# Patient Record
(demographics unavailable — no encounter records)

---
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
## 2024-10-07 NOTE — PHYSICAL EXAM
[FreeTextEntry1] : Exam limited via telehealth:\par  MS:  awake, alert, coherent, fluent, comprehension intact, affect stable.  oriented\par  Cardiac/pulmonary/extremity exam deferred via telehealth.\par   [General Appearance - Alert] : alert [General Appearance - In No Acute Distress] : in no acute distress [General Appearance - Well Nourished] : well nourished [Oriented To Time, Place, And Person] : oriented to person, place, and time [Affect] : the affect was normal [Person] : oriented to person [Place] : oriented to place [Time] : oriented to time [Cranial Nerves Optic (II)] : visual acuity intact bilaterally,  visual fields full to confrontation, pupils equal round and reactive to light [Cranial Nerves Oculomotor (III)] : extraocular motion intact [Cranial Nerves Trigeminal (V)] : facial sensation intact symmetrically [Cranial Nerves Facial (VII)] : face symmetrical [Cranial Nerves Vestibulocochlear (VIII)] : hearing was intact bilaterally [Cranial Nerves Glossopharyngeal (IX)] : tongue and palate midline [Cranial Nerves Accessory (XI - Cranial And Spinal)] : head turning and shoulder shrug symmetric [Cranial Nerves Hypoglossal (XII)] : there was no tongue deviation with protrusion [Motor Tone] : muscle tone was normal in all four extremities [Motor Strength] : muscle strength was normal in all four extremities [Involuntary Movements] : no involuntary movements were seen [Sensation Tactile Decrease] : light touch was intact [Balance] : balance was intact [2+] : Patella left 2+ [Sclera] : the sclera and conjunctiva were normal [PERRL With Normal Accommodation] : pupils were equal in size, round, reactive to light, with normal accommodation [Outer Ear] : the ears and nose were normal in appearance [Neck Appearance] : the appearance of the neck was normal [Abnormal Walk] : normal gait [Musculoskeletal - Swelling] : no joint swelling seen [Skin Color & Pigmentation] : normal skin color and pigmentation [] : no rash [Paresis Pronator Drift Right-Sided] : no pronator drift on the right [Paresis Pronator Drift Left-Sided] : no pronator drift on the left [Motor Strength Upper Extremities Bilaterally] : strength was normal in both upper extremities [Motor Strength Lower Extremities Bilaterally] : strength was normal in both lower extremities

## 2024-10-07 NOTE — DISCUSSION/SUMMARY
[Medically Refractory (seizure within the last year)] : Medically Refractory (seizure within the last year) [Complex Partial] : complex partial [Secondary Generalization] : secondary generalization [Idiopathic] : idiopathic  [Focal] : focal [Risks Associated with Driving/[place] Law] : As per my usual protocol, the patient was advised in regards to risks and driving privileges associated with the [place] Guidelines.  [Safety Recommendations] : The patient was advised in regards to the risk of seizures and general seizure safety recommendations including not to be bathing alone, climbing to high places and operating heavy machinery. [Compliance with Medications] : The importance of compliance with medications was reinforced. [Medication Side Effects] : High frequency and serious potential medication adverse effects were reviewed with the patient, including but not exclusive to psychiatric effects.  Information sheets on medication side effects were made available to the patient in our clinic.  The patient or advocate agrees to notify us for any concerns. [Surgical Options/Risks/Benefits] : Surgical options/risks/benefits for intractable epilepsy were discussed. [Risk of Death] : Risk of death associated with seizures / SUDEP was discussed. [EMU Consent:] : As per our usual protocol, staff discussed video EEG monitoring for the purpose of diagnosis, treatment decisions, teaching, research or publication (if de-identified).  Patient aware recording is continuous (24hrs/day), that they may be under constant observation (with exceptions), and that portions of the video EEG will be stored digitally.  In some cases AEDs may be adjusted to allow for observable seizure activity. The anticipated benefits of the study, the foreseeable risks associated with the procedure including experiencing seizures, which could be more severe than usual. The risk from seizures includes falls, fractures, muscle injury, dental injury, postictal psychiatric symptoms, and heart rate or breathing abnormalities. There will be a risk of experiencing skin irritation or breakdown from the electrodes being placed on the skin.  [Mental Health Evaluation] : Mental health evaluation" was recommended with either our  and psychologist, at [facility] ([facility] ): [phone], or [facility] Intake: [phone] [Inpatient video EEG study ordered with length of stay anticipated in days: _____] : Inpatient video EEG study ordered with length of stay anticipated in days: [unfilled] [Event capture (for localization, differential diagnosis) (typically 3-5 days)] : Event capture (for localization, differential diagnosis) (typically 3-5 days)  [FreeTextEntry1] : 49year-old RH male with a history of depression, paroxysmal Afib, R DVT (2017, 2/2019) and h/o DVT, B/L PE (2/2019 and 2024) on Xarelto, L hydronephrosis and ureteral stone  -Intractable focal epilepsy characterized by focal aware, focal impaired and focal to BL tonic clonic sz.  MRI brain unremarkable. EEG with RT SW.  Szs poorly localized/lateralized. no interval sz on Xcopri 400 mg and  mg bid Had recent PE was put back on Xarelto   Plan: - Plan of care was discussed with Dr Mejía attending. - Continue on  mg bid well tolerated  - Continue Xcopri 400 mg qhs well tolerated - continue CZP ODT 1mg prn sz aura - no driving allowed /restricted, safety concerns - Follow up with PMD, needs to establish care with cardiologist, pulmonologist, hematologist.  was recently restarted on Xarelto.  - Discussed [facility] for online therapy.  requested FRANCIA LEDESMA to reach out to pt.  - npsych referral.  d/w pt re surgical candidacy with R temporal IEDs, with prior neg MRI would need PET, SEEG.  need noninvasive szs captured, rec EMU.  Pt reluctant to proceed will revisit next office visit.  - Provided letter with diagnosis (was fired from a prev job for urinating in public during the episode) - all questions answered - RTC in 3 - 4monnth with dr Sofie iverson to call sooner prn   x 35min    [Simple Partial] : simple partial Ambulatory

## 2024-10-07 NOTE — PHYSICAL EXAM
[FreeTextEntry1] : Exam limited via telehealth:\par  MS:  awake, alert, coherent, fluent, comprehension intact, affect stable.  oriented\par  Cardiac/pulmonary/extremity exam deferred via telehealth.\par   [General Appearance - Alert] : alert [General Appearance - In No Acute Distress] : in no acute distress [General Appearance - Well Nourished] : well nourished [Oriented To Time, Place, And Person] : oriented to person, place, and time [Affect] : the affect was normal [Person] : oriented to person [Place] : oriented to place [Time] : oriented to time [Cranial Nerves Optic (II)] : visual acuity intact bilaterally,  visual fields full to confrontation, pupils equal round and reactive to light [Cranial Nerves Oculomotor (III)] : extraocular motion intact [Cranial Nerves Trigeminal (V)] : facial sensation intact symmetrically [Cranial Nerves Facial (VII)] : face symmetrical [Cranial Nerves Vestibulocochlear (VIII)] : hearing was intact bilaterally [Cranial Nerves Glossopharyngeal (IX)] : tongue and palate midline [Cranial Nerves Accessory (XI - Cranial And Spinal)] : head turning and shoulder shrug symmetric [Cranial Nerves Hypoglossal (XII)] : there was no tongue deviation with protrusion [Motor Tone] : muscle tone was normal in all four extremities [Motor Strength] : muscle strength was normal in all four extremities [Involuntary Movements] : no involuntary movements were seen [Sensation Tactile Decrease] : light touch was intact [Balance] : balance was intact [2+] : Patella left 2+ [Sclera] : the sclera and conjunctiva were normal [PERRL With Normal Accommodation] : pupils were equal in size, round, reactive to light, with normal accommodation [Outer Ear] : the ears and nose were normal in appearance [Neck Appearance] : the appearance of the neck was normal [Abnormal Walk] : normal gait [Musculoskeletal - Swelling] : no joint swelling seen [Skin Color & Pigmentation] : normal skin color and pigmentation [] : no rash [Paresis Pronator Drift Left-Sided] : no pronator drift on the left [Paresis Pronator Drift Right-Sided] : no pronator drift on the right [Motor Strength Upper Extremities Bilaterally] : strength was normal in both upper extremities [Motor Strength Lower Extremities Bilaterally] : strength was normal in both lower extremities
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
## 2024-10-07 NOTE — DISCUSSION/SUMMARY
[Medically Refractory (seizure within the last year)] : Medically Refractory (seizure within the last year) [Complex Partial] : complex partial [Secondary Generalization] : secondary generalization [Idiopathic] : idiopathic  [Focal] : focal [Risks Associated with Driving/[place] Law] : As per my usual protocol, the patient was advised in regards to risks and driving privileges associated with the [place] Guidelines.  [Safety Recommendations] : The patient was advised in regards to the risk of seizures and general seizure safety recommendations including not to be bathing alone, climbing to high places and operating heavy machinery. [Compliance with Medications] : The importance of compliance with medications was reinforced. [Medication Side Effects] : High frequency and serious potential medication adverse effects were reviewed with the patient, including but not exclusive to psychiatric effects.  Information sheets on medication side effects were made available to the patient in our clinic.  The patient or advocate agrees to notify us for any concerns. [Surgical Options/Risks/Benefits] : Surgical options/risks/benefits for intractable epilepsy were discussed. [Risk of Death] : Risk of death associated with seizures / SUDEP was discussed. [EMU Consent:] : As per our usual protocol, staff discussed video EEG monitoring for the purpose of diagnosis, treatment decisions, teaching, research or publication (if de-identified).  Patient aware recording is continuous (24hrs/day), that they may be under constant observation (with exceptions), and that portions of the video EEG will be stored digitally.  In some cases AEDs may be adjusted to allow for observable seizure activity. The anticipated benefits of the study, the foreseeable risks associated with the procedure including experiencing seizures, which could be more severe than usual. The risk from seizures includes falls, fractures, muscle injury, dental injury, postictal psychiatric symptoms, and heart rate or breathing abnormalities. There will be a risk of experiencing skin irritation or breakdown from the electrodes being placed on the skin.  [Mental Health Evaluation] : Mental health evaluation" was recommended with either our  and psychologist, at [facility] ([facility] ): [phone], or [facility] Intake: [phone] [Inpatient video EEG study ordered with length of stay anticipated in days: _____] : Inpatient video EEG study ordered with length of stay anticipated in days: [unfilled] [Event capture (for localization, differential diagnosis) (typically 3-5 days)] : Event capture (for localization, differential diagnosis) (typically 3-5 days)  [FreeTextEntry1] : 49year-old RH male with a history of depression, paroxysmal Afib, R DVT (2017, 2/2019) and h/o DVT, B/L PE (2/2019 and 2024) on Xarelto, L hydronephrosis and ureteral stone  -Intractable focal epilepsy characterized by focal aware, focal impaired and focal to BL tonic clonic sz.  MRI brain unremarkable. EEG with RT SW.  Szs poorly localized/lateralized. no interval sz on Xcopri 400 mg and  mg bid Had recent PE was put back on Xarelto   Plan: - Plan of care was discussed with Dr Mejía attending. - Continue on  mg bid well tolerated  - Continue Xcopri 400 mg qhs well tolerated - continue CZP ODT 1mg prn sz aura - no driving allowed /restricted, safety concerns - Follow up with PMD, needs to establish care with cardiologist, pulmonologist, hematologist.  was recently restarted on Xarelto.  - Discussed [employer] for online therapy.  requested FRANCIA LEDESMA to reach out to pt.  - npsych referral.  d/w pt re surgical candidacy with R temporal IEDs, with prior neg MRI would need PET, SEEG.  need noninvasive szs captured, rec EMU.  Pt reluctant to proceed will revisit next office visit.  - Provided letter with diagnosis (was fired from a prev job for urinating in public during the episode) - all questions answered - RTC in 3 - 4monnth with dr Sofie iverson to call sooner prn   x 35min    [Simple Partial] : simple partial

## 2025-05-14 NOTE — PHYSICAL EXAM
[General Appearance - Alert] : alert [General Appearance - In No Acute Distress] : in no acute distress [General Appearance - Well Nourished] : well nourished [Oriented To Time, Place, And Person] : oriented to person, place, and time [Affect] : the affect was normal [Person] : oriented to person [Place] : oriented to place [Time] : oriented to time [Cranial Nerves Optic (II)] : visual acuity intact bilaterally,  visual fields full to confrontation, pupils equal round and reactive to light [Cranial Nerves Oculomotor (III)] : extraocular motion intact [Cranial Nerves Trigeminal (V)] : facial sensation intact symmetrically [Cranial Nerves Facial (VII)] : face symmetrical [Cranial Nerves Vestibulocochlear (VIII)] : hearing was intact bilaterally [Cranial Nerves Glossopharyngeal (IX)] : tongue and palate midline [Cranial Nerves Accessory (XI - Cranial And Spinal)] : head turning and shoulder shrug symmetric [Cranial Nerves Hypoglossal (XII)] : there was no tongue deviation with protrusion [Motor Tone] : muscle tone was normal in all four extremities [Motor Strength] : muscle strength was normal in all four extremities [Involuntary Movements] : no involuntary movements were seen [Sensation Tactile Decrease] : light touch was intact [Balance] : balance was intact [2+] : Patella left 2+ [Sclera] : the sclera and conjunctiva were normal [PERRL With Normal Accommodation] : pupils were equal in size, round, reactive to light, with normal accommodation [Outer Ear] : the ears and nose were normal in appearance [Neck Appearance] : the appearance of the neck was normal [Abnormal Walk] : normal gait [Musculoskeletal - Swelling] : no joint swelling seen [Skin Color & Pigmentation] : normal skin color and pigmentation [] : no rash [Paresis Pronator Drift Right-Sided] : no pronator drift on the right [Paresis Pronator Drift Left-Sided] : no pronator drift on the left [Motor Strength Upper Extremities Bilaterally] : strength was normal in both upper extremities [Motor Strength Lower Extremities Bilaterally] : strength was normal in both lower extremities
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
## 2025-05-14 NOTE — DISCUSSION/SUMMARY
[Medically Refractory (seizure within the last year)] : Medically Refractory (seizure within the last year) [Simple Partial] : simple partial [Complex Partial] : complex partial [Secondary Generalization] : secondary generalization [Idiopathic] : idiopathic  [Focal] : focal [Risks Associated with D[place] Law] : As per my usual protocol, the patient was advised in regards to risks and driving privileges associated with the [place] Guidelines.  [Safety Recommendations] : The patient was advised in regards to the risk of seizures and general seizure safety recommendations including not to be bathing alone, climbing to high places and operating heavy machinery. [Compliance with Medications] : The importance of compliance with medications was reinforced. [Medication Side Effects] : High frequency and serious potential medication adverse effects were reviewed with the patient, including but not exclusive to psychiatric effects.  Information sheets on medication side effects were made available to the patient in our clinic.  The patient or advocate agrees to notify us for any concerns. [Risk of Death] : Risk of death associated with seizures / SUDEP was discussed. [Mental Health Evaluation] : Mental health evaluation" was recommended with either our  and psychologist, at [facility] ([facility] ): [phone], or [facility] Intake: [phone] [FreeTextEntry1] : 49year-old RH male with a history of depression, paroxysmal Afib, R DVT (2017, 2/2019) and h/o DVT, B/L PE (2/2019 and 2024) on Xarelto, L hydronephrosis and ureteral stone  -Intractable focal epilepsy characterized by focal aware, focal impaired and focal to BL tonic clonic sz.  MRI brain unremarkable. EEG with RT SW.  Szs poorly localized/lateralized. no interval sz on Xcopri 400 mg and  mg bid Had recent PE was put back on Xarelto   Plan: - Plan of care was discussed with Dr Mejía attending. - Continue on  mg bid well tolerated  - Continue Xcopri 400 mg qhs well tolerated - continue CZP ODT 1mg prn sz aura - no driving allowed /restricted, safety concerns - Follow up with PMD, needs to establish care with cardiologist, pulmonologist, hematologist.  was recently restarted on Xarelto.  - Discussed [employer] for online therapy.  requested FRANCIA LEDESMA to reach out to pt.  - npsych referral.  d/w pt re surgical candidacy with R temporal IEDs, with prior neg MRI would need PET, SEEG.  need noninvasive szs captured, rec EMU.  Pt reluctant to proceed will revisit next office visit.  - Provided letter with diagnosis (was fired from a prev job for urinating in public during the episode) - all questions answered - RTC in 3 - 4monnth with dr Sofie iverson to call sooner prn   x 35min

## 2025-05-14 NOTE — DISCUSSION/SUMMARY
[Medically Refractory (seizure within the last year)] : Medically Refractory (seizure within the last year) [Simple Partial] : simple partial [Complex Partial] : complex partial [Secondary Generalization] : secondary generalization [Idiopathic] : idiopathic  [Focal] : focal [[place]isks Associated with D[place] Law] : As per my usual protocol, the patient was advised in regards to risks and driving privileges associated with the [place] Guidelines.  [Safety Recommendations] : The patient was advised in regards to the risk of seizures and general seizure safety recommendations including not to be bathing alone, climbing to high places and operating heavy machinery. [Compliance with Medications] : The importance of compliance with medications was reinforced. [Medication Side Effects] : High frequency and serious potential medication adverse effects were reviewed with the patient, including but not exclusive to psychiatric effects.  Information sheets on medication side effects were made available to the patient in our clinic.  The patient or advocate agrees to notify us for any concerns. [Risk of Death] : Risk of death associated with seizures / SUDEP was discussed. [Mental Health Evaluation] : Mental health evaluation" was recommended with either our  and psychologist, at [facility] ([facility] of ): [phone], or [facility] Intake: [phone] [FreeTextEntry1] : 49year-old RH male with a history of depression, paroxysmal Afib, R DVT (2017, 2/2019) and h/o DVT, B/L PE (2/2019 and 2024) on Xarelto, L hydronephrosis and ureteral stone  -Intractable focal epilepsy characterized by focal aware, focal impaired and focal to BL tonic clonic sz.  MRI brain unremarkable. EEG with RT SW.  Szs poorly localized/lateralized. no interval sz on Xcopri 400 mg and  mg bid Had recent PE was put back on Xarelto   Plan: - Plan of care was discussed with Dr Mejía attending. - Continue on  mg bid well tolerated  - Continue Xcopri 400 mg qhs well tolerated - continue CZP ODT 1mg prn sz aura - no driving allowed /restricted, safety concerns - Follow up with PMD, needs to establish care with cardiologist, pulmonologist, hematologist.  was recently restarted on Xarelto.  - Discussed [employer] for online therapy.  requested FRANCIA LEDESMA to reach out to pt.  - npsych referral.  d/w pt re surgical candidacy with R temporal IEDs, with prior neg MRI would need PET, SEEG.  need noninvasive szs captured, rec EMU.  Pt reluctant to proceed will revisit next office visit.  - Provided letter with diagnosis (was fired from a prev job for urinating in public during the episode) - all questions answered - RTC in 3 - 4monnth with dr Sofie iverson to call sooner prn   x 35min
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
